# Patient Record
Sex: MALE | Race: OTHER | HISPANIC OR LATINO | Employment: FULL TIME | URBAN - METROPOLITAN AREA
[De-identification: names, ages, dates, MRNs, and addresses within clinical notes are randomized per-mention and may not be internally consistent; named-entity substitution may affect disease eponyms.]

---

## 2024-02-29 ENCOUNTER — OFFICE VISIT (OUTPATIENT)
Dept: UROLOGY | Facility: CLINIC | Age: 54
End: 2024-02-29
Payer: COMMERCIAL

## 2024-02-29 VITALS
HEART RATE: 91 BPM | DIASTOLIC BLOOD PRESSURE: 65 MMHG | RESPIRATION RATE: 18 BRPM | SYSTOLIC BLOOD PRESSURE: 106 MMHG | WEIGHT: 137.88 LBS

## 2024-02-29 DIAGNOSIS — R33.9 URINARY RETENTION: Primary | ICD-10-CM

## 2024-02-29 PROCEDURE — 3078F DIAST BP <80 MM HG: CPT | Mod: CPTII,S$GLB,, | Performed by: NURSE PRACTITIONER

## 2024-02-29 PROCEDURE — 1159F MED LIST DOCD IN RCRD: CPT | Mod: CPTII,S$GLB,, | Performed by: NURSE PRACTITIONER

## 2024-02-29 PROCEDURE — 99999 PR PBB SHADOW E&M-NEW PATIENT-LVL III: CPT | Mod: PBBFAC,,, | Performed by: NURSE PRACTITIONER

## 2024-02-29 PROCEDURE — 3074F SYST BP LT 130 MM HG: CPT | Mod: CPTII,S$GLB,, | Performed by: NURSE PRACTITIONER

## 2024-02-29 PROCEDURE — 99205 OFFICE O/P NEW HI 60 MIN: CPT | Mod: S$GLB,,, | Performed by: NURSE PRACTITIONER

## 2024-02-29 PROCEDURE — 1160F RVW MEDS BY RX/DR IN RCRD: CPT | Mod: CPTII,S$GLB,, | Performed by: NURSE PRACTITIONER

## 2024-02-29 RX ORDER — TAMSULOSIN HYDROCHLORIDE 0.4 MG/1
0.4 CAPSULE ORAL DAILY
Qty: 30 CAPSULE | Refills: 11 | Status: SHIPPED | OUTPATIENT
Start: 2024-02-29 | End: 2025-02-28

## 2024-02-29 RX ORDER — ONDANSETRON HYDROCHLORIDE 8 MG/1
8 TABLET, FILM COATED ORAL EVERY 8 HOURS PRN
COMMUNITY

## 2024-02-29 RX ORDER — OXYCODONE HCL 10 MG/1
10 TABLET, FILM COATED, EXTENDED RELEASE ORAL EVERY 12 HOURS
COMMUNITY

## 2024-02-29 NOTE — PROGRESS NOTES
Chief Complaint:   Urinary retention    HPI:   Patient is a 53-year-old male presenting with an acute onset of urinary retention.  Patient was recently diagnosed with liver cancer and is seeking care at [facility].  Is status post robotic laparoscopy of liver resection and right colon resection, February 13th and February 21st respectively.  Patient states he has not had a bowel movement in over 9 days.  Complains of right upper quadrant abdominal pain.  Presented to ED, found to be in urinary retention, Orona catheter placed.  Denies gross hematuria.  No history of  cancers in his family.  No BPH meds.    Allergies:  Patient has no known allergies.    Medications:  has a current medication list which includes the following prescription(s): ondansetron and oxycodone.    Review of Systems:  General: No fever, chills.  Skin: No rashes, itching, or changes in color or texture of skin.  Chest: Denies PAUL, cyanosis, wheezing, cough, and sputum production.  Abdomen:  Positive for weight loss, right upper quadrant abdominal pain, nausea and loss of appetite.  Musculoskeletal: No joint stiffness or swelling. Denies back pain.  : As above.      PMH:  Liver cancer    PSH:  February 13, 2024, robotic laparoscopy liver resection, robotic laparoscopy right colon resection, February 21, 2024      SocHx:  reports that he has never smoked. He has never used smokeless tobacco. No history on file for alcohol use and drug use.      Physical Exam:  Vitals:    02/29/24 1459   BP: 106/65   Pulse: 91   Resp: 18     General: A&Ox3, apparent moderate discomfort, lying on bed the entire visit.  Neck: No masses, normal thyroid  Lungs: normal inspiration, no use of accessory muscles  Heart: normal pulse, no arrhythmias  Abdomen:  Mildly distended with right upper quadrant tenderness  Labs/Studies:   Urine in clinic is negative    Impression/Plan:   1. Urinary retention secondary to chronic constipation  Patient  and family are aware constipation exacerbates his urinary retention.  Tamsulosin was prescribed and sent to his local pharmacy.  Orona catheter will remain and patient to return to clinic in 1 week for a voiding trial.    2. Right upper quadrant abdominal pain  Wife is concerned secondary to new onset of right upper quadrant abdominal pain and severe constipation without resolve.  Wife and patient were urge to follow-up, possibly ED visit, with [facility] or [facility] providers.    Patient and wife are non-English speaking, Turkish only, [facility] approved  was utilized for entire visit.

## 2024-03-07 ENCOUNTER — CLINICAL SUPPORT (OUTPATIENT)
Dept: UROLOGY | Facility: CLINIC | Age: 54
End: 2024-03-07
Payer: COMMERCIAL

## 2024-03-07 ENCOUNTER — OFFICE VISIT (OUTPATIENT)
Dept: UROLOGY | Facility: CLINIC | Age: 54
End: 2024-03-07
Payer: COMMERCIAL

## 2024-03-07 VITALS — RESPIRATION RATE: 14 BRPM | WEIGHT: 144 LBS

## 2024-03-07 DIAGNOSIS — R33.9 URINARY RETENTION: Primary | ICD-10-CM

## 2024-03-07 DIAGNOSIS — Z30.09 VASECTOMY EVALUATION: ICD-10-CM

## 2024-03-07 PROCEDURE — 99999 PR PBB SHADOW E&M-EST. PATIENT-LVL III: CPT | Mod: PBBFAC,,, | Performed by: NURSE PRACTITIONER

## 2024-03-07 PROCEDURE — 99999 PR PBB SHADOW E&M-EST. PATIENT-LVL I: CPT | Mod: PBBFAC,,,

## 2024-03-07 PROCEDURE — 99214 OFFICE O/P EST MOD 30 MIN: CPT | Mod: S$GLB,,, | Performed by: NURSE PRACTITIONER

## 2024-03-07 PROCEDURE — 1159F MED LIST DOCD IN RCRD: CPT | Mod: CPTII,S$GLB,, | Performed by: NURSE PRACTITIONER

## 2024-03-07 NOTE — PROGRESS NOTES
Per order of Bushra Ramirez, pt's fletcher catheter was removed without difficulty.  Pt instructed to go home and drink plenty of water and return to clinic at 1 pm for PVR.

## 2024-03-07 NOTE — PROGRESS NOTES
Patient came back this afternoon for a PVR  after having fletcher catheter removed this morning. in which resulted 10ml after emptying bladder. NP Bushra Ramirez was informed and patient was able to leave without a cath. Patient was also informed to complete the tamsulosin and give us a call back when refill is needed. Patient will return to clinic in 3 months to see NP.

## 2024-03-07 NOTE — PROGRESS NOTES
Chief Complaint:   Urinary retention     HPI:   Patient is presenting for voiding trial.  States that he has had several bowel movements since our last visit.  02/29/2024  Patient is a 53-year-old male presenting with an acute onset of urinary retention.  Patient was recently diagnosed with liver cancer and is seeking care at [facility].  Is status post robotic laparoscopy of liver resection and right colon resection, February 13th and February 21st respectively.  Patient states he has not had a bowel movement in over 9 days.  Complains of right upper quadrant abdominal pain.  Presented to ED, found to be in urinary retention, Orona catheter placed.  Denies gross hematuria.  No history of  cancers in his family.  No BPH meds.     Allergies:  Patient has no known allergies.     Medications:  has a current medication list which includes the following prescription(s): ondansetron and oxycodone.     Review of Systems:  General: No fever, chills.  Skin: No rashes, itching, or changes in color or texture of skin.  Chest: Denies PAUL, cyanosis, wheezing, cough, and sputum production.  Abdomen:  Positive for weight loss, right upper quadrant abdominal pain, nausea and loss of appetite.  Musculoskeletal: No joint stiffness or swelling. Denies back pain.  : As above.        PMH:  Liver cancer     PSH:  February 13, 2024, robotic laparoscopy liver resection, robotic laparoscopy right colon resection, February 21, 2024        SocHx:  reports that he has never smoked. He has never used smokeless tobacco. No history on file for alcohol use and drug use.       Physical Exam:     General: A&Ox3, apparent moderate discomfort, lying on bed the entire visit.  Neck: No masses, normal thyroid  Lungs: normal inspiration, no use of accessory muscles  Heart: normal pulse, no arrhythmias  Abdomen:  Mildly distended with right upper quadrant tenderness  Labs/Studies:   Urine in clinic is negative      Impression/Plan:   1. Urinary retention secondary to chronic constipation    Orona catheter was removed and patient to return in 5 hours for reassessment and PVR.    Patient return to clinic in PVR was 7 mL.  Aware of the need to stay on tamsulosin.  Return to clinic for re-evaluation in 3 months.     Patient and wife are non-English speaking, Iranian only, [facility] approved  was utilized for entire visit.

## 2024-03-12 DIAGNOSIS — C18.9 COLON CANCER: ICD-10-CM

## 2024-03-12 DIAGNOSIS — C78.7 COLON CANCER METASTASIZED TO LIVER: Primary | ICD-10-CM

## 2024-03-12 DIAGNOSIS — C18.9 COLON CANCER METASTASIZED TO LIVER: Primary | ICD-10-CM

## 2024-03-20 ENCOUNTER — DOCUMENTATION ONLY (OUTPATIENT)
Dept: HEMATOLOGY/ONCOLOGY | Facility: CLINIC | Age: 54
End: 2024-03-20
Payer: COMMERCIAL

## 2024-03-21 ENCOUNTER — OFFICE VISIT (OUTPATIENT)
Dept: HEMATOLOGY/ONCOLOGY | Facility: CLINIC | Age: 54
End: 2024-03-21
Payer: COMMERCIAL

## 2024-03-21 ENCOUNTER — TELEPHONE (OUTPATIENT)
Dept: HEMATOLOGY/ONCOLOGY | Facility: CLINIC | Age: 54
End: 2024-03-21

## 2024-03-21 VITALS
HEART RATE: 124 BPM | SYSTOLIC BLOOD PRESSURE: 116 MMHG | OXYGEN SATURATION: 94 % | DIASTOLIC BLOOD PRESSURE: 84 MMHG | RESPIRATION RATE: 18 BRPM | TEMPERATURE: 98 F | WEIGHT: 147.63 LBS

## 2024-03-21 DIAGNOSIS — C78.02 MALIGNANT NEOPLASM METASTATIC TO BOTH LUNGS: ICD-10-CM

## 2024-03-21 DIAGNOSIS — C78.01 MALIGNANT NEOPLASM METASTATIC TO BOTH LUNGS: ICD-10-CM

## 2024-03-21 DIAGNOSIS — C78.7 COLON CANCER METASTASIZED TO LIVER: ICD-10-CM

## 2024-03-21 DIAGNOSIS — C78.6 PERITONEAL CARCINOMATOSIS: ICD-10-CM

## 2024-03-21 DIAGNOSIS — G89.3 NEOPLASM RELATED PAIN: ICD-10-CM

## 2024-03-21 DIAGNOSIS — C78.7 METASTASIS TO LIVER: ICD-10-CM

## 2024-03-21 DIAGNOSIS — C18.9 COLON CANCER METASTASIZED TO LIVER: ICD-10-CM

## 2024-03-21 DIAGNOSIS — C18.2 MALIGNANT NEOPLASM OF ASCENDING COLON: Primary | ICD-10-CM

## 2024-03-21 LAB
ALBUMIN SERPL-MCNC: 2.4 G/DL (ref 3.5–5)
ALBUMIN/GLOB SERPL: 0.6 RATIO (ref 1.1–2)
ALP SERPL-CCNC: 1435 UNIT/L (ref 40–150)
ALT SERPL-CCNC: 113 UNIT/L (ref 0–55)
AST SERPL-CCNC: 164 UNIT/L (ref 5–34)
BASOPHILS # BLD AUTO: 0.04 X10(3)/MCL
BASOPHILS NFR BLD AUTO: 0.3 %
BILIRUB SERPL-MCNC: 2.1 MG/DL
BUN SERPL-MCNC: 17.2 MG/DL (ref 8.4–25.7)
CALCIUM SERPL-MCNC: 9.3 MG/DL (ref 8.4–10.2)
CANCER AG19-9 SERPL-ACNC: 1613.78 UNIT/ML (ref 0–37)
CEA SERPL-MCNC: ABNORMAL NG/ML (ref 0–3)
CHLORIDE SERPL-SCNC: 91 MMOL/L (ref 98–107)
CO2 SERPL-SCNC: 26 MMOL/L (ref 22–29)
CREAT SERPL-MCNC: 0.96 MG/DL (ref 0.73–1.18)
EOSINOPHIL # BLD AUTO: 0.02 X10(3)/MCL (ref 0–0.9)
EOSINOPHIL NFR BLD AUTO: 0.1 %
ERYTHROCYTE [DISTWIDTH] IN BLOOD BY AUTOMATED COUNT: 16.9 % (ref 11.5–17)
GFR SERPLBLD CREATININE-BSD FMLA CKD-EPI: >60 MLS/MIN/1.73/M2
GLOBULIN SER-MCNC: 3.8 GM/DL (ref 2.4–3.5)
GLUCOSE SERPL-MCNC: 112 MG/DL (ref 74–100)
HCT VFR BLD AUTO: 32.6 % (ref 42–52)
HGB BLD-MCNC: 10.7 G/DL (ref 14–18)
IMM GRANULOCYTES # BLD AUTO: 0.08 X10(3)/MCL (ref 0–0.04)
IMM GRANULOCYTES NFR BLD AUTO: 0.5 %
LYMPHOCYTES # BLD AUTO: 1.01 X10(3)/MCL (ref 0.6–4.6)
LYMPHOCYTES NFR BLD AUTO: 6.7 %
MCH RBC QN AUTO: 28.5 PG (ref 27–31)
MCHC RBC AUTO-ENTMCNC: 32.8 G/DL (ref 33–36)
MCV RBC AUTO: 86.7 FL (ref 80–94)
MONOCYTES # BLD AUTO: 1.73 X10(3)/MCL (ref 0.1–1.3)
MONOCYTES NFR BLD AUTO: 11.4 %
NEUTROPHILS # BLD AUTO: 12.29 X10(3)/MCL (ref 2.1–9.2)
NEUTROPHILS NFR BLD AUTO: 81 %
PLATELET # BLD AUTO: 369 X10(3)/MCL (ref 130–400)
PMV BLD AUTO: 8.3 FL (ref 7.4–10.4)
POTASSIUM SERPL-SCNC: 4.8 MMOL/L (ref 3.5–5.1)
PROT SERPL-MCNC: 6.2 GM/DL (ref 6.4–8.3)
RBC # BLD AUTO: 3.76 X10(6)/MCL (ref 4.7–6.1)
SODIUM SERPL-SCNC: 126 MMOL/L (ref 136–145)
WBC # SPEC AUTO: 15.17 X10(3)/MCL (ref 4.5–11.5)

## 2024-03-21 PROCEDURE — 36415 COLL VENOUS BLD VENIPUNCTURE: CPT | Performed by: INTERNAL MEDICINE

## 2024-03-21 PROCEDURE — 99205 OFFICE O/P NEW HI 60 MIN: CPT | Mod: S$GLB,,, | Performed by: INTERNAL MEDICINE

## 2024-03-21 PROCEDURE — 86301 IMMUNOASSAY TUMOR CA 19-9: CPT | Performed by: INTERNAL MEDICINE

## 2024-03-21 PROCEDURE — 3074F SYST BP LT 130 MM HG: CPT | Mod: CPTII,S$GLB,, | Performed by: INTERNAL MEDICINE

## 2024-03-21 PROCEDURE — 1159F MED LIST DOCD IN RCRD: CPT | Mod: CPTII,S$GLB,, | Performed by: INTERNAL MEDICINE

## 2024-03-21 PROCEDURE — 85025 COMPLETE CBC W/AUTO DIFF WBC: CPT | Performed by: INTERNAL MEDICINE

## 2024-03-21 PROCEDURE — 99999 PR PBB SHADOW E&M-EST. PATIENT-LVL III: CPT | Mod: PBBFAC,,, | Performed by: INTERNAL MEDICINE

## 2024-03-21 PROCEDURE — 82378 CARCINOEMBRYONIC ANTIGEN: CPT | Performed by: INTERNAL MEDICINE

## 2024-03-21 PROCEDURE — 80053 COMPREHEN METABOLIC PANEL: CPT | Performed by: INTERNAL MEDICINE

## 2024-03-21 PROCEDURE — 3079F DIAST BP 80-89 MM HG: CPT | Mod: CPTII,S$GLB,, | Performed by: INTERNAL MEDICINE

## 2024-03-21 RX ORDER — MORPHINE SULFATE 30 MG/1
30 TABLET, FILM COATED, EXTENDED RELEASE ORAL EVERY 8 HOURS PRN
Qty: 90 TABLET | Refills: 0 | Status: SHIPPED | OUTPATIENT
Start: 2024-03-21 | End: 2024-04-20

## 2024-03-21 NOTE — TELEPHONE ENCOUNTER
Please request records from [facility] in [place] including most recent chemotherapy/immunotherapy or treatnment given there.    Thanks

## 2024-03-21 NOTE — PROGRESS NOTES
HEMATOLOGY/ONCOLOGY OFFICE CLINIC VISIT    Visit Information:    Initial Evaluation: 3/21/2024  Referring Provider:   Other providers: Constantin Torres PA-C  (GI Medical Oncology [facility])   Code status: Not addressed    Diagnosis:  Stage RAQUEL-[id number] colon cancer  --Metastatic ascending colon carcinoma (MiNEN - predominantly neuroendocrine carcinoma in liver resection)  --Molecular: HER2 0; Tempus: KRAS G12D, TP53 R282W and I195T, APC N741fs and C1133nl, KMT2D P5445xh, MYC gain, MARCO, TMB 5.8 m/MB, PDL1 neg   --Peritoneal carcinomatosis    Present treatment:  Carbo/VP16 + Tecentriq (??) x 2 cycles (-2024)    Treatment/Oncology history:  7/15/22 C-scope showed a large ulcerated ascending colon mass and pathology showed moderate to poorly differentiated adenocarcinoma with NE features arising from TVA. Liver biopsy was consistent with poorly diff carcinoma  22-22 FOLFOXIRI/olga x10c, marked decrease in size of cecal mass along with sig improved LNs  23 Right hemicolectomy and partial hepatectomy (Dr. Dale Nieves); seg8 liver showed 1.8cm poorly diff carcinoma (predominantly NEC component), colon path with 2.1cm poorly diff mixed adenocarcinoma and NEC, 3/14 LN, negative margins  23 CT CAP - recurrence with 3 new lesions in right liver  23-11/15/23 FOLFIRI/olga, initial CA followed by PD  23 CT CAP - increase in size of bilobar liver mets, stable to min increase in subcent lung nodules, slight increase in peritoneal mets  23-24 Treatment on trial [id number] [id number] (pan-KRAS inhibitor)  0.25 mg PO Daily in a 21 day cycle, monotherapy, discontinued for PD (117.6% increase in peritoneum, liver, lungs and multicompartment adenopathy)       Goal of care: palliative    Imagin23 CT CAP - recurrence with 3 new lesions in right liver  2023 MRI A:  Multiple bilobar hepatic metastases  2023: PET scan: Widely metastatic disease in abdomen and  pelvis with liver lesions and carcinomatosis  09/14/2023: CT scans with good response  11/16/23 CT CAP - increase in size of bilobar liver mets, stable to min increase in subcent lung nodules, slight increase in peritoneal mets  CT CAP 1/25/2024 (MDA): enlarging pulmonary nodules. Examples include a 1.8 cm right upper lobe 0.7 cm and a 1.2 cm left lower lobe metastasis 0.5 cm. New suspicious lymph nodes. A 1.1 cm AP window. A 1.4 cm right intercostal node. A 1.2 cm cardiophrenic node.  Hepatic metastases are larger. An example is a 6.2 cm metastasis in the right liver hat measures 3.7 cm. There are also multiple new hepatic metastases. Increasing peritoneal disease. For example a 1.8 cm left upper quadrant implant 0.9 cm. A 4.9 cm implant involves the transverse colon previously measuring 2 cm. A 6 cm pelvic implant 2.3 cm New adenopathy. Examples include a 1.4 cm retrocaval node and a 1 cm right external iliac node.   Impression:  1. Increasing metastatic disease involving the peritoneum, liver and lungs   2. New multicompartment metastatic adenopathy     PET-CT 03/12/2024 (outside facility):  Hypermetabolic mediastinal, hilar in right internal mammary lymph node present consistent with metastatic disease.  Right hilar lymph node 2.3 cm with SUV 13.2.  Small right pleural effusion present as well as hypermetabolic pleural nodules consistent with metastatic disease.  Multiple pulmonary nodules present with associated hypermetabolic activity.  Left lower lobe nodule 1.6 and 1.2 cm with SUV of 3.0 innumerable hypermetabolic hepatic metastases present for example 9.5 x 5.7 cm right hepatic metastases with SUV 13.8.  Hypermetabolic abdominal lymphadenopathy present with hypermetabolic peritoneal and omental metastases.  A 6.8 x 4.6 cm hypermetabolic soft tissue masses/lymphadenopathy present in the rectal vesicular region consistent with malignancy/metastatic disease with SUV of 8.3.  Impression  Marked progression of  metastatic disease in chest, abdomen and pelvis.  Hypermetabolic wall thickening present along the suture line of the right colon consistent with tumor.    Pathology:  7/15/22   Colon, ascending, mass biopsy:  Poorly differentiated neuroendocrine carcinoma component, arising in a background of tubulovillous adenoma.  Tumor cells showed retained nuclear expression for the mismatch repair proteins MLH1, PSM to, MSH6 and MSH2, favoring a low probability for high microsatellite instability.  Liver mass biopsy: Poorly differentiated carcinoma.  HER2 negative (score 0)    2/13/23   Right colon and terminal ileum, right colectomy:  Mixed poorly differentiated adeno carcinoma and neuroendocrine carcinoma, 2.1 cm, lymphovascular and perineural invasion present. 3/13 LN, negative margins  Liver, segment 8, resection:  Metastatic poorly differentiated carcinoma (predominantly neuroendocrine carcinoma component, 1.8 cm       Poorly differentiated adeno carcinoma component 70% and neuroendocrine carcinoma component 30%, best classified as mixed adeno carcinoma and neuroendocrine carcinoma (MANEC).  TUMOR IN LYMPH NODE REPRESENTS BOTH COMPONENTS PREDOMINANTLY ADENO CARCINOMA AND TUMOR IN THE LIVER PREDOMINANTLY FEATURES OF NEUROENDOCRINE COMPONENT WITH A KI 67 INDEX OF MORE THAN 90%    CLINICAL HISTORY:       Patient: Vishnu Perez is a 53 y.o.  male with above medical history that presented for further evaluation and treatment recommendations regarding his stage IV colon cancer.    Patient was initially diagnosed with metastatic colon cancer when he presented with partial obstruction and found out to have liver metastases.  He underwent colonoscopy on 07/15/2022 that showed moderately to poorly differentiated invasive adeno carcinoma with neuroendocrine features arising in a tubulovillous adenoma.  Liver biopsy same day positive for metastatic adeno carcinoma consistent with colorectal origin.  Patient was started on  FOLFIRI and Avastin on 08/16/2022.  Restaging CT scan of the chest abdomen and pelvis on 11/03/2022 showed improvement of his masslike region involving the cecum and improving lymphadenopathy.  At the time liver lesions were no longer visible.  On 02/23/2023 he underwent hemicolectomy with liver resection segment 8.    Patient went to [facility] and in November of 2023 he had a CT scan that showed gross progression of disease.  He was started on phase 1 trial [id number] [id number] (pan-KRAS inhibitor)  0.25 mg PO Daily in a 21 day cycle, monotherapy, EGFR mutation but unfortunately he had rapid progression of his disease.  Patient was seen at [facility] in January of 2024 at at that time they recommend carboplatin and etoposide base of predominantly neuroendocrine component of his tumor.    Patient restaging imaging studies after 2 cycles of Carboplatinum and etoposide and Tecentriq with significant progression of disease.    Patient is here today with wife and a friend to discuss further options of treatment.  They live in [place].  There are followed by Dr. Peter Mccann.  Patient overall performance status with rapid decline and significant pain that is located in the right side of the abdomen, referred to the back, and right shoulder. The pain worsens with any type of movement. He can only tolerate the pain when he is laying down, with little relief from narcotic medication.  He was lying on his left side on the exam table to rinse the visit secondary to pain.    He was last seen by his physician, Dr. Peter Mccann, on 03/12/2024 at which time they discuss palliative care, end of life issues, living will, power of  and DNR/DNI status.  Was recommending supportive care and hospice.  He did not think that he will tolerate further cytotoxic chemotherapy and he strongly believe that most likely will be ineffective given previous exposure to chemotherapy.    Chief Complaint: New Patient (NP referred by  Self for Colon Ca Met to Liver.)      Interval History:        History reviewed. No pertinent past medical history.   History reviewed. No pertinent surgical history.  History reviewed. No pertinent family history.  Social Connections: Not on file       Review of patient's allergies indicates:  No Known Allergies   Current Outpatient Medications on File Prior to Visit   Medication Sig Dispense Refill    ondansetron (ZOFRAN) 8 MG tablet Take 8 mg by mouth every 8 (eight) hours as needed for Nausea.      oxyCODONE (OXYCONTIN) 10 mg 12 hr tablet Take 10 mg by mouth every 12 (twelve) hours.      tamsulosin (FLOMAX) 0.4 mg Cap Take 1 capsule (0.4 mg total) by mouth once daily. 30 capsule 11     No current facility-administered medications on file prior to visit.      Review of Systems   Constitutional:  Positive for activity change, appetite change, fatigue and unexpected weight change. Negative for chills, diaphoresis and fever.   HENT:  Negative for nasal congestion, mouth sores, nosebleeds, sinus pressure/congestion, sore throat and trouble swallowing.    Eyes: Negative.    Respiratory:  Negative for cough and shortness of breath.    Cardiovascular:  Negative for chest pain and palpitations.   Gastrointestinal:  Positive for abdominal distention, abdominal pain and constipation. Negative for blood in stool, change in bowel habit, diarrhea, nausea and vomiting.   Endocrine: Negative.    Genitourinary:  Positive for difficulty urinating. Negative for bladder incontinence, decreased urine volume, dysuria, frequency, hematuria and urgency.   Musculoskeletal:  Negative for arthralgias, back pain, gait problem, joint swelling, leg pain and myalgias.   Integumentary:  Negative for rash.   Allergic/Immunologic: Negative.    Neurological:  Positive for weakness. Negative for dizziness, tremors, syncope, light-headedness, numbness, headaches and memory loss.   Hematological:  Negative for adenopathy. Does not bruise/bleed  easily.   Psychiatric/Behavioral:  Negative for agitation, confusion, hallucinations, sleep disturbance and suicidal ideas. The patient is not nervous/anxious.               Vitals:    03/21/24 1403   BP: 116/84   BP Location: Left arm   Patient Position: Sitting   Pulse: (!) 124   Resp: 18   Temp: 98 °F (36.7 °C)   TempSrc: Oral   SpO2: (!) 94%   Weight: 67 kg (147 lb 9.6 oz)      Wt Readings from Last 6 Encounters:   03/21/24 67 kg (147 lb 9.6 oz)   03/07/24 65.3 kg (144 lb)   02/29/24 62.5 kg (137 lb 14.4 oz)     There is no height or weight on file to calculate BMI.  There is no height or weight on file to calculate BSA.  Physical Exam  Vitals and nursing note reviewed.   Constitutional:       General: He is not in acute distress.     Appearance: He is cachectic. He is ill-appearing.   HENT:      Head: Normocephalic and atraumatic.      Mouth/Throat:      Mouth: Mucous membranes are moist.   Eyes:      General: No scleral icterus.     Extraocular Movements: Extraocular movements intact.      Conjunctiva/sclera: Conjunctivae normal.   Neck:      Vascular: No JVD.   Cardiovascular:      Rate and Rhythm: Normal rate and regular rhythm.      Heart sounds: No murmur heard.  Pulmonary:      Effort: Pulmonary effort is normal.      Breath sounds: Normal breath sounds. No wheezing or rhonchi.   Abdominal:      General: Bowel sounds are decreased. There is distension.      Palpations: There is hepatomegaly.      Tenderness: There is abdominal tenderness in the right upper quadrant, epigastric area and periumbilical area.   Musculoskeletal:         General: No swelling or deformity.      Cervical back: Neck supple.   Lymphadenopathy:      Head:      Right side of head: No submandibular adenopathy.      Left side of head: No submandibular adenopathy.      Cervical: No cervical adenopathy.      Upper Body:      Right upper body: No supraclavicular or axillary adenopathy.      Left upper body: No supraclavicular or axillary  adenopathy.      Lower Body: No right inguinal adenopathy. No left inguinal adenopathy.   Skin:     General: Skin is warm.      Coloration: Skin is not jaundiced.      Findings: No rash.   Neurological:      General: No focal deficit present.      Mental Status: He is alert and oriented to person, place, and time.      Cranial Nerves: Cranial nerves 2-12 are intact.   Psychiatric:         Attention and Perception: Attention normal.         Behavior: Behavior is cooperative.       ECOG SCORE    3 - Capable of only limited selfcare, confined to bed or chair more than 50% of waking hours         Laboratory:  CBC with Differential:  Lab Results   Component Value Date    WBC 15.17 (H) 03/21/2024    RBC 3.76 (L) 03/21/2024    HGB 10.7 (L) 03/21/2024    HCT 32.6 (L) 03/21/2024    MCV 86.7 03/21/2024    MCH 28.5 03/21/2024    MCHC 32.8 (L) 03/21/2024    RDW 16.9 03/21/2024     03/21/2024    MPV 8.3 03/21/2024        CMP:  Sodium Level   Date Value Ref Range Status   03/21/2024 126 (L) 136 - 145 mmol/L Final     Potassium Level   Date Value Ref Range Status   03/21/2024 4.8 3.5 - 5.1 mmol/L Final     Carbon Dioxide   Date Value Ref Range Status   03/21/2024 26 22 - 29 mmol/L Final     Blood Urea Nitrogen   Date Value Ref Range Status   03/21/2024 17.2 8.4 - 25.7 mg/dL Final   09/14/2023 28 (H) 6 - 23 mg/dL Final     Creatinine   Date Value Ref Range Status   03/21/2024 0.96 0.73 - 1.18 mg/dL Final   09/14/2023 1.31 (H) 0.67 - 1.17 mg/dL Final     Calcium Level Total   Date Value Ref Range Status   03/21/2024 9.3 8.4 - 10.2 mg/dL Final     Albumin Level   Date Value Ref Range Status   03/21/2024 2.4 (L) 3.5 - 5.0 g/dL Final     Bilirubin Total   Date Value Ref Range Status   03/21/2024 2.1 (H) <=1.5 mg/dL Final     Alkaline Phosphatase   Date Value Ref Range Status   03/21/2024 1,435 (H) 40 - 150 unit/L Final     Aspartate Aminotransferase   Date Value Ref Range Status   03/21/2024 164 (H) 5 - 34 unit/L Final      Alanine Aminotransferase   Date Value Ref Range Status   03/21/2024 113 (H) 0 - 55 unit/L Final       Latest Reference Range & Units 03/21/24 15:02   CEA 0.00 - 3.00 ng/mL 28,094.40 (H)   CA 19-9 0.00 - 37.00 unit/mL 1,613.78 (H)   (H): Data is abnormally high       Assessment:       1. Malignant neoplasm of ascending colon    2. Metastasis to liver    3. Malignant neoplasm metastatic to both lungs    4. Peritoneal carcinomatosis    5. Colon cancer metastasized to liver    6. 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  Patient with stage IV colon cancer therefore treatment is palliative and not curative.Explained to the patient that the cancer has spread, in other words metastasized, therefore, is not curable but can be treated.  Palliative treatment may include chemotherapy, immunotherapy, radiation therapy, etc and is focused in help to shrink the cancer, improve symptoms caused by the cancer and prolong life.     Discussed briefly chemotherapy, risks versus benefits as well as toxicities associated with it.    Educated the patient on the risks versus benefits as well as toxicities associated with treatment.  Verbally consented the patient to the treatment plan and the patient was educated on the planned duration of the treatment and schedule of the treatment administration.  All questions were answered.      1) Metastatic cecal colon cancer  Tempus: KRAS G12D, TP53 R282W and I195T, APC N741fs and T2250oi, KMT2D L5844kd, MYC gain, MARCO,  TMB 5.8 m/MB, PDL1 neg   He initially presented with synchronous oligometastases to liver in Jul 2022. He received neoadjuvant FOLFOXIRI+Bevacizumab Aug-Dec 2022 x 10 cycles with improvement. On 2/23/23 he underwent right hemicolectomy and liver resection, with [id number] disease. However he had rapid disease recurrence with new liver metastases noted in May 2023, and further growth with peritoneal carcinomatosis. He was treated on FOLFIRI and bevacizumab x4 months with PD on restaging 11/16. Still had neuropathy from prior oxaliplatin.  - [facility] and been enrolled in ([id number]) [id number] GABBY inhibitor.  - Restaging scans indicate extensive disease progression in the peritoneum, liver, and lungs with new metastatic adenopathy.   - Given rapid progression on clinical trial KRAS inhibitor, this was d/c by the [facility] physician. The pt has significant travel burden and restriction with mobility, making another trial option not feasible at this time.   - We recommend re-biopsy of liver metastasis in order to determine how much of the tumor is comprised by neuroendocrine carcinoma vs adenocarcinoma.   - For now, we recommend treating with next line standard of care, carboplatinum/etoposide vs FOLFOX/bevacizumab.   - Will communicate with local oncologist to determine best regiment based on their clinical evaluation of the patient and their PS.         Plan:       Patient with significant progression of disease.  Recent PET-CT 3/12/2024 was compared with scans from 08/2022 but after reviewing his electronic medical records and imaging reports he still have significant progression compared to imaging studies done prior to PET in 03/2024.  Patient is weak and debilitated.  He spent most of the time lying in bed due to pain.  Very aggressive disease resistant to treatment    He was last seen by his physician, Dr. Peter Mccann, on 03/8/2024 at which time they discuss palliative care, end of life  issues, living will, power of  and DNR/DNI status.  Was recommending supportive care and hospice.  He did not think that he will tolerate further cytotoxic chemotherapy and he strongly believe that most likely will be ineffective given previous exposure to chemotherapy.    I agree with Dr. Mccann.  Patient refractory to cytotoxic chemotherapy, pan-KRAS inhibitor.  PDL1 is 0, therefore he will not response to PDL inhibitors either.  Oral medications such as Lonsurf or Stivarga will unlikely control his disease due to tumor burden and likely refractory disease.    I will add MS Contin to his regimen to see if helps with the pain.     I discussed with him that they may can contact with [facility] to see if there is any study that he may be a candidate but otherwise I think that palliative and comfort care is the best option to him.    They live in [place] and they will like to have any treatment recommended there.  Again, unfortunately I do not have anything to offer the patient at this time.  As mentioned above, he will not be able to tolerate any cytotoxic therapy performance status, advanced disease and refractory in nature.  I explained to them that his cancer is very aggressive.  I think the most important thing at this time is to control his pain improve comfort.    Hospice: I had a long conversation with the patient family regarding end of life issues, palliative, comfort care as well as hospice. Explained that Palliative care services focuses on providing patients with relief from the symptoms. The goal is to improve quality of life for both the patient and the family. Palliative care focuses on symptoms such as pain, shortness of breath, fatigue, constipation, nausea, loss of appetite, difficulty sleeping and depression, etc. Hospice focuses on caring, not curing and in most cases care is provided in the patient's home. This is offer when patient's are not a candidate for aggressive chemotherapy  or treatment or if patient's performance status is not acceptable for treatment. This service goes hand to hand with comfort care and is provided to patients that prognosis is expected to be <  6 months.        MS Contin 30 mg PO TID --E prescribed with instructions to start q 12 hrs first before increasing to TID  Cont Oxycodone to breakthrough pain  Labs today as above    RTC prn      Mamie Welch MD  Hematology/Oncology

## 2024-03-25 ENCOUNTER — TELEPHONE (OUTPATIENT)
Dept: HEMATOLOGY/ONCOLOGY | Facility: CLINIC | Age: 54
End: 2024-03-25
Payer: COMMERCIAL

## 2024-03-27 NOTE — TELEPHONE ENCOUNTER
Pt son Stephen called, apologized for not being able to answer when you called on Monday. He asks that you call him back.